# Patient Record
Sex: FEMALE | URBAN - METROPOLITAN AREA
[De-identification: names, ages, dates, MRNs, and addresses within clinical notes are randomized per-mention and may not be internally consistent; named-entity substitution may affect disease eponyms.]

---

## 2021-08-25 ENCOUNTER — HISTORICAL (OUTPATIENT)
Dept: ENDOSCOPY | Facility: HOSPITAL | Age: 76
End: 2021-08-25

## 2021-08-25 LAB
GLUCOSE SERPL-MCNC: 106 MG/DL (ref 70–105)
HCO3 UR-SCNC: 25.5 MMOL/L (ref 22–26)
HCT VFR BLD CALC: 40 % (ref 38–51)
HGB BLD-MCNC: 13.6 MG/DL (ref 12–17)
PCO2 BLDA: 43.7 MMHG (ref 35–45)
PH SMN: 7.37 [PH] (ref 7.35–7.45)
PO2 BLDA: 55 MMHG (ref 80–100)
POC BE: 0 MMOL/L (ref -2–3)
POC IONIZED CALCIUM: 1.2 MMOL/L (ref 1.12–1.32)
POC SATURATED O2: 87 % (ref 96–97)
POC TCO2: 27 MMOL/L (ref 24–29)
POTASSIUM BLD-SCNC: 3.9 MMOL/L (ref 3.5–4.9)
SODIUM BLD-SCNC: 144 MMOL/L (ref 138–146)

## 2022-04-30 NOTE — H&P
Patient:   Coreen Bruno             MRN: [id number]            FIN: [id number]               Age:   76 years     Sex:  Female     :  1945   Associated Diagnoses:   None   Author:   Fabian Robbins MD A      Chief Complaint   Concern for choledocholithiasis    76-year-old female referred to us for evaluation of abnormal liver chemistries.  Structural imaging suggest cholelithiasis and possible choledocholithiasis.  This was confirmed on MRCP.  Liver chemistries alter between transaminase increase and normal levels.  Clinically, her appetite is good she tolerates her diet well without abdominal pain reports regular bowel movements.  She presents today for ERCP.      Health Status   Allergies:    Allergic Reactions (All)  No Known Medication Allergies,    Allergies (1) Active Reaction  No Known Medication Allergies None Documented     Current medications:  (Selected)   Inpatient Medications  Ordered  Buffered Lidocaine 1% - 1mL syringe: 0.5 mL, 5 mg =, form: Injection, ID, As Directed PRN for other (see comment), first dose 21 7:42:00 CDT, May inject 0.5mL at IV site, if not allergic  Indocin 50 mg rectal suppository: 100 mg, form: Supp, MI (rectal), Once, first dose 21 9:00:00 CDT, stop date 21 9:00:00 CDT  Lactated Ringers Infusion 1,000 mL: 1,000 mL, 1,000 mL, IV, 20 mL/hr, start date 21 7:08:00 CDT  Plasmalyte 1,000 mL: 1,000 mL, 1,000 mL, IV, 20 mL/hr, start date 21 7:42:00 CDT, 1.76, m2  midazolam: 2 mg, form: Injection, IV Push, q5min, Order duration: 2 dose(s), first dose 21 7:42:00 CDT, stop date 21 7:51:00 CDT, STAT, (up to 5 mg for moderate anxiety)  Documented Medications  Documented  atorvastatin 20 mg oral tablet: 20 mg = 1 tab(s), Oral, Daily, # 30 tab(s), 0 Refill(s)  lisinopril 20 mg oral tablet: 20 mg = 1 tab(s), Oral, Daily, # 30 tab(s), 0 Refill(s),    Home Medications (2) Active  atorvastatin 20 mg oral tablet 20 mg = 1 tab(s), Oral,  Daily  lisinopril 20 mg oral tablet 20 mg = 1 tab(s), Oral, Daily     Problem list:    All Problems  Obesity / SNOMED CT [id number] / Probable      Histories   Past Medical History:    No active or resolved past medical history items have been selected or recorded.   Family History:    No family history items have been selected or recorded.   Procedure history:    No active procedure history items have been selected or recorded.   Social History        Social & Psychosocial Habits    Tobacco  08/24/2021  Use: Never (less than 100 in l    Patient Wants Consult For Cessation Counseling No    08/25/2021  Use: Never (less than 100 in l    Patient Wants Consult For Cessation Counseling No    Abuse/Neglect  08/25/2021  SHX Any signs of abuse or neglect No    Feels unsafe at home: No    Safe place to go: Yes  .        Physical Examination      Vital Signs (last 24 hrs)_____  Last Charted___________  Temp Tympanic    37 DegC  (AUG 25 07:15)  Heart Rate Peripheral   85 bpm  (AUG 25 07:15)  Resp Rate         16 br/min  (AUG 25 07:15)  SBP      H 169mmHg  (AUG 25 07:15)  DBP      71 mmHg  (AUG 25 07:15)  SpO2      96 %  (AUG 25 07:15)  Weight      74.8 kg  (AUG 25 07:10)  Height      157.4 cm  (AUG 25 07:10)  BMI      30.19  (AUG 25 07:10)     Measurements from flowsheet : Measurements   8/25/2021 7:10 CDT       Weight Dosing             74.8 kg                             Weight Measured           74.8 kg                             Weight Measured and Calculated in Lbs     164.90 lb                             Height/Length Dosing      157.4 cm                             Height/Length Measured    157.4 cm                             Body Mass Index Measured  30.19 kg/m2        Impression and Plan   Abnormal liver chemistries with abnormal MRCP suggesting choledocholithiasisplan proceed with ERCP